# Patient Record
Sex: MALE | Race: WHITE | URBAN - METROPOLITAN AREA
[De-identification: names, ages, dates, MRNs, and addresses within clinical notes are randomized per-mention and may not be internally consistent; named-entity substitution may affect disease eponyms.]

---

## 2020-07-16 ENCOUNTER — NEW PATIENT (OUTPATIENT)
Dept: URBAN - METROPOLITAN AREA CLINIC 85 | Facility: CLINIC | Age: 65
End: 2020-07-16
Payer: COMMERCIAL

## 2020-07-16 DIAGNOSIS — H25.13 AGE-RELATED NUCLEAR CATARACT, BILATERAL: ICD-10-CM

## 2020-07-16 DIAGNOSIS — H25.813 COMBINED FORMS OF AGE-RELATED CATARACT, BILATERAL: Primary | ICD-10-CM

## 2020-07-16 PROCEDURE — 92004 COMPRE OPH EXAM NEW PT 1/>: CPT | Performed by: OPHTHALMOLOGY

## 2020-07-16 RX ORDER — OFLOXACIN 3 MG/ML
0.3 % SOLUTION/ DROPS OPHTHALMIC
Qty: 5 | Refills: 1 | Status: INACTIVE
Start: 2020-07-16 | End: 2021-11-16

## 2020-07-16 RX ORDER — PREDNISOLONE ACETATE 10 MG/ML
1 % SUSPENSION/ DROPS OPHTHALMIC
Qty: 10 | Refills: 1 | Status: INACTIVE
Start: 2020-07-16 | End: 2021-11-16

## 2020-07-16 ASSESSMENT — INTRAOCULAR PRESSURE
OD: 12
OS: 12

## 2020-07-16 ASSESSMENT — VISUAL ACUITY
OD: 20/30
OS: 20/40

## 2020-07-16 ASSESSMENT — KERATOMETRY
OD: 42.63
OS: 42.38

## 2020-07-21 ENCOUNTER — Encounter (OUTPATIENT)
Dept: URBAN - METROPOLITAN AREA CLINIC 85 | Facility: CLINIC | Age: 65
End: 2020-07-21
Payer: COMMERCIAL

## 2020-07-21 DIAGNOSIS — Z01.818 ENCOUNTER FOR OTHER PREPROCEDURAL EXAMINATION: Primary | ICD-10-CM

## 2020-07-21 PROCEDURE — 99213 OFFICE O/P EST LOW 20 MIN: CPT | Performed by: PHYSICIAN ASSISTANT

## 2020-07-22 ENCOUNTER — SURGERY (OUTPATIENT)
Dept: URBAN - METROPOLITAN AREA SURGERY 55 | Facility: SURGERY | Age: 65
End: 2020-07-22
Payer: COMMERCIAL

## 2020-08-05 ENCOUNTER — SURGERY (OUTPATIENT)
Dept: URBAN - METROPOLITAN AREA SURGERY 55 | Facility: SURGERY | Age: 65
End: 2020-08-05
Payer: COMMERCIAL

## 2021-11-16 ENCOUNTER — OFFICE VISIT (OUTPATIENT)
Dept: URBAN - NONMETROPOLITAN AREA CLINIC 1 | Facility: CLINIC | Age: 66
End: 2021-11-16
Payer: COMMERCIAL

## 2021-11-16 DIAGNOSIS — H52.4 PRESBYOPIA: Primary | ICD-10-CM

## 2021-11-16 DIAGNOSIS — E10.3393 TYPE 1 DIAB W MODERATE NONPRLF DIAB RTNOP W/O MACULAR EDEMA, BILATERAL: ICD-10-CM

## 2021-11-16 PROCEDURE — 92082 INTERMEDIATE VISUAL FIELD XM: CPT | Performed by: OPTOMETRIST

## 2021-11-16 PROCEDURE — 92014 COMPRE OPH EXAM EST PT 1/>: CPT | Performed by: OPTOMETRIST

## 2021-11-16 ASSESSMENT — VISUAL ACUITY
OD: 20/25
OS: 20/20

## 2021-11-16 ASSESSMENT — KERATOMETRY
OD: 42.50
OS: 42.38

## 2021-11-16 ASSESSMENT — INTRAOCULAR PRESSURE
OD: 17
OS: 16

## 2021-11-16 NOTE — IMPRESSION/PLAN
Impression: Presbyopia: H52.4. Plan: Discussed diagnosis with patient. Okay to continue use of OTC readers.

## 2021-11-16 NOTE — IMPRESSION/PLAN
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 Plan: Diabetes type I: moderate background diabetic retinopathy ou with no signs of neovascularization noted. Patient was instructed to monitor vision for sudden changes and to call if any changes noted. Discussed ocular and systemic benefits of blood sugar control. Follow up with PCP/endocrinologist for further systemic management.

## 2022-02-02 ENCOUNTER — OFFICE VISIT (OUTPATIENT)
Dept: URBAN - NONMETROPOLITAN AREA CLINIC 1 | Facility: CLINIC | Age: 67
End: 2022-02-02
Payer: COMMERCIAL

## 2022-02-02 PROCEDURE — 92134 CPTRZ OPH DX IMG PST SGM RTA: CPT | Performed by: OPTOMETRIST

## 2022-02-02 PROCEDURE — 99213 OFFICE O/P EST LOW 20 MIN: CPT | Performed by: OPTOMETRIST

## 2022-02-02 ASSESSMENT — INTRAOCULAR PRESSURE
OS: 14
OD: 13

## 2022-02-02 NOTE — IMPRESSION/PLAN
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 Plan: Educated pt on exam findings. Educated pt on importance of tight diabetic (A1C target: <6.5%), blood pressure (goal: <130/80), and cholesterol control to decrease risk of retinopathy progression and further vision loss. Recommended pt continuing taking all medications as directed and follow up with PCP. Pt expressed understanding. Educated pt on mild macular changes, recommended being seen by retina for considerations if treatment is warranted at this time. Discussed possibility of updating glasses Rx if patient having difficulty with current lenses- pt to schedule refraction at a later date.

## 2022-02-18 ENCOUNTER — OFFICE VISIT (OUTPATIENT)
Dept: URBAN - NONMETROPOLITAN AREA CLINIC 1 | Facility: CLINIC | Age: 67
End: 2022-02-18
Payer: COMMERCIAL

## 2022-02-18 DIAGNOSIS — E10.3313 TYPE 1 DIAB W MODERATE NONPRLF DIAB RTNOP W MACULAR EDEMA, BILATERAL: ICD-10-CM

## 2022-02-18 ASSESSMENT — KERATOMETRY
OD: 42.63
OS: 42.50

## 2022-02-18 ASSESSMENT — INTRAOCULAR PRESSURE
OD: 16
OS: 16

## 2022-02-18 ASSESSMENT — VISUAL ACUITY
OS: 20/20
OD: 20/25

## 2022-02-18 NOTE — IMPRESSION/PLAN
Impression: Presbyopia: H52.4. Plan: Educated pt on exam findings. Updated and finalized SRx. Educated pt on 1-2 week adaptation period with updated SRx. Educated on bifocals vs. progressive lenses. Pt expressed understanding. RTC 1 year for CE, or PRN.

## 2022-02-18 NOTE — IMPRESSION/PLAN
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 Plan: Re-educated pt on mild macular changes noted at last exam, recommended being seen by retina for considerations if treatment is warranted at this time. Educated pt changes with macula can impact glasses prescription, today's SRx similar to 11/16/21 prescription- finalized today. Gave patient information for 3/28/22 exam [address] Dr[address] Jono Ramos.

## 2022-03-28 ENCOUNTER — OFFICE VISIT (OUTPATIENT)
Dept: URBAN - METROPOLITAN AREA CLINIC 83 | Facility: CLINIC | Age: 67
End: 2022-03-28
Payer: COMMERCIAL

## 2022-03-28 DIAGNOSIS — E11.3393 TYPE 2 DIAB WITH MOD NONP RTNOP WITHOUT MACULAR EDEMA, BI: Primary | ICD-10-CM

## 2022-03-28 DIAGNOSIS — Z96.1 PRESENCE OF INTRAOCULAR LENS: ICD-10-CM

## 2022-03-28 PROCEDURE — 99204 OFFICE O/P NEW MOD 45 MIN: CPT | Performed by: OPHTHALMOLOGY

## 2022-03-28 PROCEDURE — 92134 CPTRZ OPH DX IMG PST SGM RTA: CPT | Performed by: OPHTHALMOLOGY

## 2022-03-28 ASSESSMENT — INTRAOCULAR PRESSURE
OD: 17
OS: 20

## 2022-03-28 NOTE — IMPRESSION/PLAN
Impression: Type 2 diab with mod nonp rtnop without macular edema, bi: K13.7931. 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
--findings/diagnosis d/w pt in detail --treatment is not indicated at this time
--importance of tight BS/BP/lipid control discussed
--coordinate care with PCP to reduce systemic complications of DM
--pt will continue to see Dr. Mayra Farias q6 months for monitoring RTC PRN